# Patient Record
Sex: FEMALE | Race: BLACK OR AFRICAN AMERICAN | Employment: FULL TIME | URBAN - METROPOLITAN AREA
[De-identification: names, ages, dates, MRNs, and addresses within clinical notes are randomized per-mention and may not be internally consistent; named-entity substitution may affect disease eponyms.]

---

## 2017-04-03 RX ORDER — MELOXICAM 15 MG/1
TABLET ORAL
Qty: 30 TABLET | Refills: 0 | Status: SHIPPED | OUTPATIENT
Start: 2017-04-03

## 2019-06-11 ENCOUNTER — OFFICE VISIT (OUTPATIENT)
Dept: ORTHOPEDIC SURGERY | Age: 49
End: 2019-06-11
Payer: COMMERCIAL

## 2019-06-11 VITALS
BODY MASS INDEX: 36.99 KG/M2 | SYSTOLIC BLOOD PRESSURE: 165 MMHG | HEIGHT: 62 IN | WEIGHT: 201 LBS | DIASTOLIC BLOOD PRESSURE: 99 MMHG | HEART RATE: 82 BPM

## 2019-06-11 DIAGNOSIS — M25.561 ACUTE PAIN OF RIGHT KNEE: Primary | ICD-10-CM

## 2019-06-11 DIAGNOSIS — M17.11 PRIMARY OSTEOARTHRITIS OF RIGHT KNEE: ICD-10-CM

## 2019-06-11 PROCEDURE — G8427 DOCREV CUR MEDS BY ELIG CLIN: HCPCS | Performed by: ORTHOPAEDIC SURGERY

## 2019-06-11 PROCEDURE — 1036F TOBACCO NON-USER: CPT | Performed by: ORTHOPAEDIC SURGERY

## 2019-06-11 PROCEDURE — 99214 OFFICE O/P EST MOD 30 MIN: CPT | Performed by: ORTHOPAEDIC SURGERY

## 2019-06-11 PROCEDURE — G8417 CALC BMI ABV UP PARAM F/U: HCPCS | Performed by: ORTHOPAEDIC SURGERY

## 2019-06-11 PROCEDURE — 20610 DRAIN/INJ JOINT/BURSA W/O US: CPT | Performed by: ORTHOPAEDIC SURGERY

## 2019-06-11 RX ORDER — MULTIVITAMIN
1 TABLET ORAL
COMMUNITY

## 2019-06-11 RX ORDER — METHYLPREDNISOLONE ACETATE 40 MG/ML
80 INJECTION, SUSPENSION INTRA-ARTICULAR; INTRALESIONAL; INTRAMUSCULAR; SOFT TISSUE ONCE
Status: COMPLETED | OUTPATIENT
Start: 2019-06-11 | End: 2019-06-11

## 2019-06-11 RX ADMIN — METHYLPREDNISOLONE ACETATE 80 MG: 40 INJECTION, SUSPENSION INTRA-ARTICULAR; INTRALESIONAL; INTRAMUSCULAR; SOFT TISSUE at 08:43

## 2019-06-11 ASSESSMENT — ENCOUNTER SYMPTOMS
RESPIRATORY NEGATIVE: 1
ALLERGIC/IMMUNOLOGIC NEGATIVE: 1
GASTROINTESTINAL NEGATIVE: 1
EYES NEGATIVE: 1

## 2019-06-11 NOTE — LETTER
[facility] [address] [facility] [place] [address] [address]  Phone: [phone]  Fax: [phone]    Lula Simmonds, MD    June 11, 2019     [address] [place] [address]    Patient: Jesi Long  MR Number: [id number]  YOB: 1970  Date of Visit: 6/11/2019    Dear Dr. Jose Kirby:    Thank you for the request for consultation for Jesi Long to me for the evaluation of right knee pain. Below are the relevant portions of my assessment and plan of care. Assessment:       Plan:     Subjective:      Patient ID: Jesi Long is a 51 y.o. female. HPI  Jesi Long presents today for evaluation of her right knee. I last saw the right knee in 2014. She had Visco supplementation injections. About 2 weeks ago she began experiencing some right knee pain associated with hiking. It now wakes her at night. She has pain with walking and stairs. She is using ice and ibuprofen. Pain is 6.5 out of 10. Pain is medial.    Review of Systems   Constitutional: Negative. HENT: Negative. Eyes: Negative. Respiratory: Negative. Cardiovascular: Negative. Gastrointestinal: Negative. Endocrine: Negative. Genitourinary: Negative. Musculoskeletal: Positive for arthralgias. Right knee pain   Skin: Negative. Allergic/Immunologic: Negative. Neurological: Negative. Hematological: Negative. Psychiatric/Behavioral: Negative. Objective:   Physical Exam  General Exam:    Vitals: Blood pressure (!) 165/99, pulse 82, height 5' 2\" (1.575 m), weight 201 lb (91.2 kg). Constitutional: Patient is adequately groomed with no evidence of malnutrition  Mental Status: The patient is oriented to time, place and person. The patient's mood and affect are appropriate. Gait:  Patient walks with normal gait and station.   Lymphatic: The lymphatic examination bilaterally reveals all areas to be without enlargement or induration. Vascular: Examination reveals no swelling or calf tenderness. Peripheral pulses are palpable and 2+. Neurological: The patient has good coordination. There is no weakness or sensory deficit. Skin:    Head/Neck: inspection reveals no rashes, ulcerations or lesions. Trunk:  inspection reveals no rashes, ulcerations or lesions. Right Lower Extremity: inspection reveals no rashes, ulcerations or lesions. Left Lower Extremity: inspection reveals no rashes, ulcerations or lesions. Examination of the bilateral hips reveals normal flexion and extension. There is no restriction in rotation. There is no tenderness to palpation anteriorly posteriorly or laterally. Left knee has well-healed arthroscopy incisions. She has mild crepitation. She has no joint line pain or swelling. Range of motion 0 to 120 degrees. Right knee he has severe patellofemoral crepitation. She has tenderness medially and laterally with no drainable effusion range of motion 0 to 115 degrees limited by body habitus. She is ligamentously stable. Calf is soft. X-rays were obtained today. AP standing, PA flexed, and merchant views of the bilateral knees as well as a lateral of the right knee. These demonstrate:  Severe medial compartment narrowing of both knees with moderate to severe patellofemoral narrowing left greater than right    Assessment:       Exacerbation of knee arthritis      Plan: We will start with a injection in the right knee. Procedure: Under sterile technique, right knee was injected into the anterolateral arthroscopy portal with 80 mg of Depo-Medrol and 40 mg of lidocaine. She tolerated that well. Follow-up with me on an as-needed basis. This note was created using voice recognition software.  It has been

## 2019-06-11 NOTE — COMMUNICATION BODY
Assessment:       Plan:     Subjective:      Patient ID: Haleigh Malagon is a 51 y.o. female. DAT Malagon presents today for evaluation of her right knee. I last saw the right knee in 2014. She had Visco supplementation injections. About 2 weeks ago she began experiencing some right knee pain associated with hiking. It now wakes her at night. She has pain with walking and stairs. She is using ice and ibuprofen. Pain is 6.5 out of 10. Pain is medial.    Review of Systems   Constitutional: Negative. HENT: Negative. Eyes: Negative. Respiratory: Negative. Cardiovascular: Negative. Gastrointestinal: Negative. Endocrine: Negative. Genitourinary: Negative. Musculoskeletal: Positive for arthralgias. Right knee pain   Skin: Negative. Allergic/Immunologic: Negative. Neurological: Negative. Hematological: Negative. Psychiatric/Behavioral: Negative. Objective:   Physical Exam  General Exam:    Vitals: Blood pressure (!) 165/99, pulse 82, height 5' 2\" (1.575 m), weight 201 lb (91.2 kg). Constitutional: Patient is adequately groomed with no evidence of malnutrition  Mental Status: The patient is oriented to time, place and person. The patient's mood and affect are appropriate. Gait:  Patient walks with normal gait and station. Lymphatic: The lymphatic examination bilaterally reveals all areas to be without enlargement or induration. Vascular: Examination reveals no swelling or calf tenderness. Peripheral pulses are palpable and 2+. Neurological: The patient has good coordination. There is no weakness or sensory deficit. Skin:    Head/Neck: inspection reveals no rashes, ulcerations or lesions. Trunk:  inspection reveals no rashes, ulcerations or lesions. Right Lower Extremity: inspection reveals no rashes, ulcerations or lesions. Left Lower Extremity: inspection reveals no rashes, ulcerations or lesions.     Examination of the bilateral hips reveals normal flexion and extension. There is no restriction in rotation. There is no tenderness to palpation anteriorly posteriorly or laterally. Left knee has well-healed arthroscopy incisions. She has mild crepitation. She has no joint line pain or swelling. Range of motion 0 to 120 degrees. Right knee he has severe patellofemoral crepitation. She has tenderness medially and laterally with no drainable effusion range of motion 0 to 115 degrees limited by body habitus. She is ligamentously stable. Calf is soft. X-rays were obtained today. AP standing, PA flexed, and merchant views of the bilateral knees as well as a lateral of the right knee. These demonstrate:  Severe medial compartment narrowing of both knees with moderate to severe patellofemoral narrowing left greater than right    Assessment:       Exacerbation of knee arthritis      Plan: We will start with a injection in the right knee. Procedure: Under sterile technique, right knee was injected into the anterolateral arthroscopy portal with 80 mg of Depo-Medrol and 40 mg of lidocaine. She tolerated that well. Follow-up with me on an as-needed basis. This note was created using voice recognition software. It has been proofread, but occasionally errors remain. Please disregard these errors. They will be corrected as they are noted.

## 2019-06-11 NOTE — PROGRESS NOTES
Subjective:      Patient ID: Fernanda Torrez is a 51 y.o. female. HPI  Fernanda Torrez presents today for evaluation of her right knee. I last saw the right knee in 2014. She had Visco supplementation injections. About 2 weeks ago she began experiencing some right knee pain associated with hiking. It now wakes her at night. She has pain with walking and stairs. She is using ice and ibuprofen. Pain is 6.5 out of 10. Pain is medial.    Review of Systems   Constitutional: Negative. HENT: Negative. Eyes: Negative. Respiratory: Negative. Cardiovascular: Negative. Gastrointestinal: Negative. Endocrine: Negative. Genitourinary: Negative. Musculoskeletal: Positive for arthralgias. Right knee pain   Skin: Negative. Allergic/Immunologic: Negative. Neurological: Negative. Hematological: Negative. Psychiatric/Behavioral: Negative. Objective:   Physical Exam  General Exam:    Vitals: Blood pressure (!) 165/99, pulse 82, height 5' 2\" (1.575 m), weight 201 lb (91.2 kg). Constitutional: Patient is adequately groomed with no evidence of malnutrition  Mental Status: The patient is oriented to time, place and person. The patient's mood and affect are appropriate. Gait:  Patient walks with normal gait and station. Lymphatic: The lymphatic examination bilaterally reveals all areas to be without enlargement or induration. Vascular: Examination reveals no swelling or calf tenderness. Peripheral pulses are palpable and 2+. Neurological: The patient has good coordination. There is no weakness or sensory deficit. Skin:    Head/Neck: inspection reveals no rashes, ulcerations or lesions. Trunk:  inspection reveals no rashes, ulcerations or lesions. Right Lower Extremity: inspection reveals no rashes, ulcerations or lesions. Left Lower Extremity: inspection reveals no rashes, ulcerations or lesions.     Examination of the bilateral hips reveals normal flexion and extension. There is no restriction in rotation. There is no tenderness to palpation anteriorly posteriorly or laterally. Left knee has well-healed arthroscopy incisions. She has mild crepitation. She has no joint line pain or swelling. Range of motion 0 to 120 degrees. Right knee he has severe patellofemoral crepitation. She has tenderness medially and laterally with no drainable effusion range of motion 0 to 115 degrees limited by body habitus. She is ligamentously stable. Calf is soft. X-rays were obtained today. AP standing, PA flexed, and merchant views of the bilateral knees as well as a lateral of the right knee. These demonstrate:  Severe medial compartment narrowing of both knees with moderate to severe patellofemoral narrowing left greater than right    Assessment:       Exacerbation of knee arthritis      Plan: We will start with a injection in the right knee. Procedure: Under sterile technique, right knee was injected into the anterolateral arthroscopy portal with 80 mg of Depo-Medrol and 40 mg of lidocaine. She tolerated that well. Follow-up with me on an as-needed basis. This note was created using voice recognition software. It has been proofread, but occasionally errors remain. Please disregard these errors. They will be corrected as they are noted.

## 2019-07-02 ENCOUNTER — HOSPITAL ENCOUNTER (OUTPATIENT)
Dept: MAMMOGRAPHY | Age: 49
Discharge: HOME | End: 2019-07-07
Payer: COMMERCIAL

## 2019-07-02 DIAGNOSIS — Z12.31 VISIT FOR SCREENING MAMMOGRAM: ICD-10-CM

## 2019-07-02 PROCEDURE — 77063 BREAST TOMOSYNTHESIS BI: CPT

## 2020-11-05 ENCOUNTER — HOSPITAL ENCOUNTER (OUTPATIENT)
Dept: MAMMOGRAPHY | Age: 50
Discharge: HOME | End: 2020-11-10
Payer: COMMERCIAL

## 2020-11-05 PROCEDURE — 77063 BREAST TOMOSYNTHESIS BI: CPT

## 2021-11-08 ENCOUNTER — HOSPITAL ENCOUNTER (OUTPATIENT)
Dept: MAMMOGRAPHY | Age: 51
Discharge: HOME | End: 2021-11-13
Payer: COMMERCIAL

## 2021-11-08 DIAGNOSIS — Z12.31 VISIT FOR SCREENING MAMMOGRAM: ICD-10-CM

## 2021-12-30 ENCOUNTER — HOSPITAL ENCOUNTER (OUTPATIENT)
Dept: MAMMOGRAPHY | Age: 51
Discharge: HOME | End: 2022-01-04
Payer: COMMERCIAL

## 2021-12-30 VITALS — BODY MASS INDEX: 44.53 KG/M2 | WEIGHT: 242 LBS | HEIGHT: 62 IN

## 2021-12-30 PROCEDURE — 77067 SCR MAMMO BI INCL CAD: CPT

## 2023-01-18 ENCOUNTER — HOSPITAL ENCOUNTER (OUTPATIENT)
Dept: MAMMOGRAPHY | Age: 53
Discharge: HOME | End: 2023-01-18
Payer: COMMERCIAL

## 2023-01-18 VITALS — HEIGHT: 62 IN | WEIGHT: 244 LBS | BODY MASS INDEX: 44.9 KG/M2

## 2023-01-18 DIAGNOSIS — Z12.39 SCREENING BREAST EXAMINATION: ICD-10-CM

## 2023-01-18 PROCEDURE — 77067 SCR MAMMO BI INCL CAD: CPT

## 2024-01-29 ENCOUNTER — HOSPITAL ENCOUNTER (OUTPATIENT)
Dept: MAMMOGRAPHY | Age: 54
Discharge: HOME | End: 2024-02-03
Payer: COMMERCIAL

## 2024-01-29 VITALS — HEIGHT: 62 IN | BODY MASS INDEX: 43.79 KG/M2 | WEIGHT: 238 LBS

## 2024-01-29 DIAGNOSIS — Z12.31 VISIT FOR SCREENING MAMMOGRAM: ICD-10-CM

## 2024-01-29 PROCEDURE — 77063 BREAST TOMOSYNTHESIS BI: CPT

## 2025-01-30 ENCOUNTER — HOSPITAL ENCOUNTER (OUTPATIENT)
Dept: MAMMOGRAPHY | Age: 55
Discharge: HOME | End: 2025-01-30
Payer: COMMERCIAL

## 2025-01-30 VITALS — WEIGHT: 233 LBS | HEIGHT: 62 IN | BODY MASS INDEX: 42.88 KG/M2

## 2025-01-30 DIAGNOSIS — Z12.31 VISIT FOR SCREENING MAMMOGRAM: ICD-10-CM

## 2025-01-30 PROCEDURE — 77063 BREAST TOMOSYNTHESIS BI: CPT

## 2025-05-27 ENCOUNTER — OFFICE VISIT (OUTPATIENT)
Age: 55
End: 2025-05-27

## 2025-05-27 VITALS
HEIGHT: 62 IN | SYSTOLIC BLOOD PRESSURE: 128 MMHG | BODY MASS INDEX: 42.62 KG/M2 | DIASTOLIC BLOOD PRESSURE: 88 MMHG | OXYGEN SATURATION: 99 % | TEMPERATURE: 97.8 F | HEART RATE: 78 BPM

## 2025-05-27 DIAGNOSIS — R21 RASH: Primary | ICD-10-CM

## 2025-05-27 RX ORDER — METHYLPREDNISOLONE 4 MG/1
TABLET ORAL
Qty: 1 KIT | Refills: 0 | Status: SHIPPED | OUTPATIENT
Start: 2025-05-27 | End: 2025-06-02

## 2025-05-27 RX ORDER — HYDROCORTISONE 25 MG/G
CREAM TOPICAL
Qty: 3.5 G | Refills: 0 | Status: SHIPPED | OUTPATIENT
Start: 2025-05-27

## 2025-05-27 ASSESSMENT — ENCOUNTER SYMPTOMS
ABDOMINAL PAIN: 0
DIARRHEA: 0
SHORTNESS OF BREATH: 0
CONSTIPATION: 0
NAUSEA: 0
VOMITING: 0
CHEST TIGHTNESS: 0
COUGH: 0

## 2025-05-27 NOTE — PROGRESS NOTES
Lin Jefferson (:  1970) is a 54 y.o. female,New patient, here for evaluation of the following chief complaint(s):  Rash (Rash on neck and chest, itching, dry x friday)      ASSESSMENT/PLAN:    ICD-10-CM    1. Rash  R21 methylPREDNISolone (MEDROL DOSEPACK) 4 MG tablet     hydrocortisone 2.5 % cream          Patient presents for rash to the left side of the neck ongoing since this weekend.   DDX: contact dermatitis vs eczema  Will rx hydrocortisone ointment  Patient understands that if this does not help the rash then she should stop ointment and start medrol dosepack.   Initial BP elevated, repeat /88.     SUBJECTIVE/OBJECTIVE:    Rash  Pertinent negatives include no cough, diarrhea, fatigue, fever, shortness of breath or vomiting.     HPI:   54 y.o. female presents with symptoms of rash to the neck. She states that the rash began this weekend. She has not had any new soaps, creams, detergents. She has not had any medications, no changes in medication dosage. She states that no one else at home has had similar symptoms. She did take a benadryl for symptoms.    Vitals:    25 1057 25 1112   BP: (!) 170/100 128/88   BP Site: Right Upper Arm    Patient Position: Sitting    BP Cuff Size: Large Adult    Pulse: 78    Temp: 97.8 °F (36.6 °C)    TempSrc: Temporal    SpO2: 99%    Height: 1.575 m (5' 2\")        Review of Systems   Constitutional:  Negative for fatigue and fever.   Respiratory:  Negative for cough, chest tightness and shortness of breath.    Cardiovascular:  Negative for chest pain.   Gastrointestinal:  Negative for abdominal pain, constipation, diarrhea, nausea and vomiting.   Musculoskeletal:  Negative for neck pain and neck stiffness.   Skin:  Positive for rash.       Physical Exam  Vitals and nursing note reviewed.   Constitutional:       Appearance: Normal appearance.   HENT:      Head: Normocephalic and atraumatic.     Eyes:      Extraocular Movements: Extraocular